# Patient Record
Sex: FEMALE | Race: WHITE | NOT HISPANIC OR LATINO
[De-identification: names, ages, dates, MRNs, and addresses within clinical notes are randomized per-mention and may not be internally consistent; named-entity substitution may affect disease eponyms.]

---

## 2017-02-06 ENCOUNTER — TRANSCRIPTION ENCOUNTER (OUTPATIENT)
Age: 32
End: 2017-02-06

## 2017-02-08 ENCOUNTER — TRANSCRIPTION ENCOUNTER (OUTPATIENT)
Age: 32
End: 2017-02-08

## 2017-03-29 ENCOUNTER — TRANSCRIPTION ENCOUNTER (OUTPATIENT)
Age: 32
End: 2017-03-29

## 2017-09-12 ENCOUNTER — TRANSCRIPTION ENCOUNTER (OUTPATIENT)
Age: 32
End: 2017-09-12

## 2018-01-17 ENCOUNTER — TRANSCRIPTION ENCOUNTER (OUTPATIENT)
Age: 33
End: 2018-01-17

## 2018-01-26 ENCOUNTER — TRANSCRIPTION ENCOUNTER (OUTPATIENT)
Age: 33
End: 2018-01-26

## 2018-05-04 ENCOUNTER — TRANSCRIPTION ENCOUNTER (OUTPATIENT)
Age: 33
End: 2018-05-04

## 2018-07-13 ENCOUNTER — TRANSCRIPTION ENCOUNTER (OUTPATIENT)
Age: 33
End: 2018-07-13

## 2018-08-31 ENCOUNTER — TRANSCRIPTION ENCOUNTER (OUTPATIENT)
Age: 33
End: 2018-08-31

## 2019-09-25 ENCOUNTER — TRANSCRIPTION ENCOUNTER (OUTPATIENT)
Age: 34
End: 2019-09-25

## 2021-02-25 ENCOUNTER — TRANSCRIPTION ENCOUNTER (OUTPATIENT)
Age: 36
End: 2021-02-25

## 2021-03-05 ENCOUNTER — TRANSCRIPTION ENCOUNTER (OUTPATIENT)
Age: 36
End: 2021-03-05

## 2021-03-11 ENCOUNTER — TRANSCRIPTION ENCOUNTER (OUTPATIENT)
Age: 36
End: 2021-03-11

## 2021-07-16 ENCOUNTER — TRANSCRIPTION ENCOUNTER (OUTPATIENT)
Age: 36
End: 2021-07-16

## 2021-07-17 PROBLEM — Z00.00 ENCOUNTER FOR PREVENTIVE HEALTH EXAMINATION: Status: ACTIVE | Noted: 2021-07-17

## 2021-07-20 ENCOUNTER — NON-APPOINTMENT (OUTPATIENT)
Age: 36
End: 2021-07-20

## 2021-07-20 ENCOUNTER — APPOINTMENT (OUTPATIENT)
Dept: ORTHOPEDIC SURGERY | Facility: CLINIC | Age: 36
End: 2021-07-20
Payer: MEDICAID

## 2021-07-20 VITALS
BODY MASS INDEX: 29.03 KG/M2 | WEIGHT: 196 LBS | HEIGHT: 69 IN | DIASTOLIC BLOOD PRESSURE: 91 MMHG | HEART RATE: 88 BPM | SYSTOLIC BLOOD PRESSURE: 142 MMHG

## 2021-07-20 DIAGNOSIS — Z78.9 OTHER SPECIFIED HEALTH STATUS: ICD-10-CM

## 2021-07-20 DIAGNOSIS — M25.572 PAIN IN LEFT ANKLE AND JOINTS OF LEFT FOOT: ICD-10-CM

## 2021-07-20 PROCEDURE — 99203 OFFICE O/P NEW LOW 30 MIN: CPT

## 2021-07-20 PROCEDURE — 73600 X-RAY EXAM OF ANKLE: CPT | Mod: LT

## 2021-07-20 PROCEDURE — 99072 ADDL SUPL MATRL&STAF TM PHE: CPT

## 2021-07-20 PROCEDURE — 73620 X-RAY EXAM OF FOOT: CPT | Mod: LT

## 2021-07-20 RX ORDER — TRAMADOL HYDROCHLORIDE AND ACETAMINOPHEN 37.5; 325 MG/1; MG/1
37.5-325 TABLET, FILM COATED ORAL
Qty: 40 | Refills: 0 | Status: ACTIVE | COMMUNITY
Start: 2021-07-20 | End: 1900-01-01

## 2021-07-21 VITALS — BODY MASS INDEX: 29.03 KG/M2 | WEIGHT: 196 LBS | HEIGHT: 69 IN

## 2021-07-21 NOTE — HISTORY OF PRESENT ILLNESS
[8] : a current pain level of 8/10 [de-identified] : The patient comes in today for her left ankle status post a fall in [place] about a week ago.  She states she came here because of significant pain to the ankle.  She states she went to [facility] and got an ankle x-ray and they stated she does not have a fracture.  She comes in today stating that she cannot even bear weight to the extremity.  She has swelling and ecchymosis.  The patient states the onset/injury occurred on 07/02/2021.  This injury is not work related or due to an automobile accident.  The patient states the pain is achy, throbbing and cramping. The patient describes the pain as constant. [de-identified] : Walking [de-identified] : Rest and ice

## 2021-07-21 NOTE — PHYSICAL EXAM
[Crutches] : ambulates with crutches [de-identified] : Left Ankle:  Range of motion not assessed secondary to significant pain.  She is significantly tender over the lateral malleolus.  She has tenderness over the fourth and fifth metatarsal area, as well.  No calf tenderness.  Good distal pulses.  She is unable to bear weight to the extremity.  She is on crutches.  \par \par Right Ankle:    Range of Motion in Degrees:\par 	                                Claimant:	                Normal:	\par Dorsiflexion (Active)	40-degrees	40-degrees	\par Dorsiflexion (Passive)	40-degrees	40-degrees	\par Plantar (Active)	                40-degrees	40-degrees	\par Plantar (Passive)	                40-degrees	40-degrees	\par Inversion (Active)	                30-degrees	30-degrees	\par Inversion (Passive)	                30-degrees	30-degrees	\par Eversion  (Active)	                20-degrees	20-degrees	\par Eversion (Passive) 	                20-degrees	20-degrees	\par \par No weakness in dorsiflexion, plantar flexion, inversion or eversion.  Normal sensation.  No tenderness over the medial or lateral ligaments.  No tenderness over the DLES.  No evidence of instability.  Negative anterior drawer sign.  No medial or lateral bony tenderness.  No proximal fibular tenderness.  No anterior, posterior, medial or lateral tendon tenderness.  No intra-articular swelling.  No extra-articular swelling, edema or tenderness.  No tenderness over the plantar aspect of the os calcis.  2+ DP and PT pulses. Skin is intact.  No rashes, scars or lesions.  \par   [de-identified] : Appearance:  Well developed, well-nourished female in no acute distress.\par   [de-identified] : Review of radiographs from another facility reveal no acute fractures or dislocations of the left ankle.  Significant soft tissue swelling.\par \par Radiographs, two views of the left ankle, reveal no fracture.\par \par Radiographs, two views of the left foot, looks like there might be a foot fracture, but there is significant soft tissue swelling near the fourth and fifth metatarsal.\par

## 2021-07-21 NOTE — DISCUSSION/SUMMARY
[de-identified] : At this time, due to left foot fracture and left ankle pain, we are going to get an MRI of the left ankle since there is a significant amount of pain and swelling to rule out a fracture status post a significant fall in [place] one week ago.  I recommended elevation, ice, keep the CAM boot on and nonweightbearing.  We put an order in for a CAM boot because she just ordered a short one on [employer] and we need a long one.  She will do a [employer] in 2 weeks.\par

## 2021-07-21 NOTE — ADDENDUM
[FreeTextEntry1] : This note was written by Kenn Tay on 07/21/2021, acting as a scribe for CHRISTIANE LEWIS, NOLAN/L, PA

## 2021-08-03 ENCOUNTER — APPOINTMENT (OUTPATIENT)
Dept: ORTHOPEDIC SURGERY | Facility: CLINIC | Age: 36
End: 2021-08-03
Payer: MEDICAID

## 2021-08-03 PROCEDURE — 99441: CPT

## 2021-08-17 ENCOUNTER — APPOINTMENT (OUTPATIENT)
Dept: ORTHOPEDIC SURGERY | Facility: CLINIC | Age: 36
End: 2021-08-17
Payer: MEDICAID

## 2021-08-17 VITALS
DIASTOLIC BLOOD PRESSURE: 84 MMHG | SYSTOLIC BLOOD PRESSURE: 118 MMHG | HEART RATE: 91 BPM | HEIGHT: 69 IN | WEIGHT: 196 LBS | BODY MASS INDEX: 29.03 KG/M2

## 2021-08-17 PROCEDURE — 99213 OFFICE O/P EST LOW 20 MIN: CPT

## 2021-08-18 NOTE — ADDENDUM
[FreeTextEntry1] : This note was written by Portia Aponte on 08/18/2021 acting as scribe for Letty Ribera, OTR/L, PA

## 2021-08-18 NOTE — PHYSICAL EXAM
[de-identified] : Left Ankle:\par The significant swelling has gone down.  She has no calf tenderness.  She has good distal pulses.  She is extremely tight and stiff.  Range of motion is not assessed secondary to fracture.  \par \par  [de-identified] : Ambulating with a CAM boot and crutches.  Station:  Normal.  [de-identified] : Appearance:  Well-developed, well-nourished female in no acute distress.\par

## 2021-08-18 NOTE — DISCUSSION/SUMMARY
[de-identified] : At this time, due to sprain/partial tear of the anterior talofibular ligament and stress fracture to the talus, the patient will return to the office in two weeks.  She is advised she can be weightbearing as tolerated. We will probably wean her into a low-profile ankle brace and start her on some physical therapy in two weeks.

## 2021-08-18 NOTE — HISTORY OF PRESENT ILLNESS
[de-identified] : The patient comes in today for a partial tearing of the anterior talofibular ligament and a stress fracture to the talus.  She is in a CAM boot and uses crutches.  The patient is almost afraid to bear weight to the extremity.  She is doing it with two crutches, but is advised she could do it with one or do weightbearing as tolerated.

## 2021-08-31 ENCOUNTER — APPOINTMENT (OUTPATIENT)
Dept: ORTHOPEDIC SURGERY | Facility: CLINIC | Age: 36
End: 2021-08-31
Payer: MEDICAID

## 2021-08-31 VITALS
DIASTOLIC BLOOD PRESSURE: 88 MMHG | WEIGHT: 196 LBS | HEART RATE: 94 BPM | BODY MASS INDEX: 29.03 KG/M2 | HEIGHT: 69 IN | SYSTOLIC BLOOD PRESSURE: 143 MMHG

## 2021-08-31 PROCEDURE — 99212 OFFICE O/P EST SF 10 MIN: CPT

## 2021-09-01 NOTE — ADDENDUM
[FreeTextEntry1] : This note was written by Kenn Tay on 09/01/2021, acting as a scribe for CHRISTIANE LEWIS, NOLAN/L, PA

## 2021-09-01 NOTE — DISCUSSION/SUMMARY
[de-identified] : At this time, due to left ankle contusion of talus bone, tear of the anterior talofibular ligament, and hematoma, I recommended to start aggressive physical therapy, low-profile ankle brace to be weaned into, and to return to the office in 3 weeks.  She will be out of work.\par

## 2021-09-01 NOTE — HISTORY OF PRESENT ILLNESS
[de-identified] : The patient comes in today for her left ankle.  The patient has a tear of the anterior talofibular ligament, contusion of bone of talus, and hematoma.  The patient is still in the boot and she has been walking with her CAM boot and the use of crutches.  She cannot actually bear weight without the use of the CAM boot on.

## 2021-09-01 NOTE — PHYSICAL EXAM
[de-identified] : Left Ankle:  Decreased range of motion with significant stiffness and some atrophy in the whole lower extremity.  Neurovascular and neurologic within normal limits.  \par \par \par \par 	                               \par \par \par   2+ DP and PT pulses. Skin is intact.  No rashes, scars or lesions.  \par   [de-identified] : Ambulating with a CAM boot and the use of crutches. [de-identified] : Appearance:  Well developed, well-nourished female in no acute distress.\par

## 2021-09-21 ENCOUNTER — APPOINTMENT (OUTPATIENT)
Dept: ORTHOPEDIC SURGERY | Facility: CLINIC | Age: 36
End: 2021-09-21
Payer: MEDICAID

## 2021-09-21 VITALS
HEIGHT: 69 IN | BODY MASS INDEX: 29.03 KG/M2 | HEART RATE: 114 BPM | SYSTOLIC BLOOD PRESSURE: 119 MMHG | WEIGHT: 196 LBS | DIASTOLIC BLOOD PRESSURE: 87 MMHG

## 2021-09-21 DIAGNOSIS — T14.8XXA OTHER INJURY OF UNSPECIFIED BODY REGION, INITIAL ENCOUNTER: ICD-10-CM

## 2021-09-21 PROCEDURE — 99213 OFFICE O/P EST LOW 20 MIN: CPT

## 2021-09-23 NOTE — ADDENDUM
[FreeTextEntry1] : This note was written by Flory Bermeo on 09/23/2021 acting as scribe for Letty Ribera, NOLAN/L, PA

## 2021-09-23 NOTE — HISTORY OF PRESENT ILLNESS
[de-identified] : The patient comes in today for her left ankle status post a hematoma, contusion of the bone and sprain of the anterior talofibular ligament.  She is doing physical therapy.  She is doing much better.  She is off the crutches.  She is out of the boot.  She is in a low profile ankle brace.

## 2021-09-23 NOTE — PHYSICAL EXAM
[de-identified] : Left Ankle: \par She still has significant weakness and tightness in her tendons but she has improved significantly.  Range of motion in dorsiflexion and plantar flexion is definitely weak and decreased secondary to pain.  \par  [de-identified] : She walks with an antalgic gait pattern still.   [de-identified] : General Appearance:  Well-developed, well-nourished female in no acute distress.

## 2021-09-23 NOTE — DISCUSSION/SUMMARY
[de-identified] : The patient is going to continue in aggressive physical therapy.  She will return back to the office in a few weeks.

## 2021-10-19 ENCOUNTER — APPOINTMENT (OUTPATIENT)
Dept: ORTHOPEDIC SURGERY | Facility: CLINIC | Age: 36
End: 2021-10-19

## 2021-10-21 ENCOUNTER — APPOINTMENT (OUTPATIENT)
Dept: ORTHOPEDIC SURGERY | Facility: CLINIC | Age: 36
End: 2021-10-21
Payer: MEDICAID

## 2021-10-21 VITALS
DIASTOLIC BLOOD PRESSURE: 83 MMHG | SYSTOLIC BLOOD PRESSURE: 125 MMHG | HEART RATE: 101 BPM | BODY MASS INDEX: 29.03 KG/M2 | WEIGHT: 196 LBS | HEIGHT: 69 IN

## 2021-10-21 PROCEDURE — 99212 OFFICE O/P EST SF 10 MIN: CPT

## 2021-10-26 NOTE — PHYSICAL EXAM
[de-identified] : Left Ankle: \par Range of Motion in Degrees:\par 	                                Claimant:	Normal:	\par Dorsiflexion (Active)	40-degrees	40-degrees	\par Dorsiflexion (Passive)	40-degrees	40-degrees	\par Plantar (Active)       	40-degrees	40-degrees	\par Plantar (Passive)     	40-degrees	40-degrees	\par Inversion (Active)    	30-degrees	30-degrees	\par Inversion (Passive)  	30-degrees	30-degrees	\par Eversion  (Active)   	20-degrees	20-degrees	\par Eversion (Passive)   	20-degrees	20-degrees	\par \par No weakness in dorsiflexion, plantar flexion, inversion or eversion.  Normal sensation.  Positive diffuse swelling laterally.  Tender over the lateral ligaments.  No tenderness over the medial  ligaments.  No tenderness over the DLES.  No evidence of instability.  Negative anterior drawer sign.  No medial bony tenderness.  No proximal fibular tenderness.  No anterior, posterior, medial or lateral tendon tenderness.  No intra-articular swelling.  No extra-articular swelling, edema or tenderness.  No tenderness over the plantar aspect of the os calcis.  2+ DP and PT pulses. Skin is intact.  No rashes, scars or lesions.  \par   [de-identified] : Ambulating with a slightly antalgic to antalgic gait.  Station:  Normal.  [de-identified] : Appearance:  Well-developed, well-nourished female in no acute distress.\par

## 2021-10-26 NOTE — CONSULT LETTER
[Dear  ___] : Dear  [unfilled], [FreeTextEntry1] : \par I am referring Letty Hanna to you for further evaluation.\par \par Thank you very much for seeing this patient.  My most recent evaluation\par follows.\par \par If you have any questions, please do not hesitate to contact me.\par \par Sincerely,\par \par \par Tim Cardona III, MD\par [facility]/sg

## 2021-10-26 NOTE — DISCUSSION/SUMMARY
[de-identified] : At this time, due to lateral ankle sprain of the left ankle, I recommend that she be evaluated by Dr. Benito because of the chronicity of the problem.

## 2021-10-26 NOTE — ADDENDUM
[FreeTextEntry1] : This note was written by Portia Aponte on 10/26/2021 acting as a scribe for FRANKY JURADO III, MD

## 2021-11-01 ENCOUNTER — APPOINTMENT (OUTPATIENT)
Dept: ORTHOPEDIC SURGERY | Facility: CLINIC | Age: 36
End: 2021-11-01
Payer: MEDICAID

## 2021-11-01 DIAGNOSIS — T14.8XXA OTHER INJURY OF UNSPECIFIED BODY REGION, INITIAL ENCOUNTER: ICD-10-CM

## 2021-11-01 PROCEDURE — 99214 OFFICE O/P EST MOD 30 MIN: CPT

## 2021-11-01 RX ORDER — METHYLPREDNISOLONE 4 MG/1
4 TABLET ORAL
Qty: 1 | Refills: 0 | Status: ACTIVE | COMMUNITY
Start: 2021-11-01 | End: 1900-01-01

## 2021-11-01 NOTE — ADDENDUM
[FreeTextEntry1] : I, Lalita Claire, acted solely as a scribe for Dr. Skyler Benito on this date 11/01/2021.\par \par All medical record entries made by the Scribe were at my, Dr. Skyler Benito, direction and personally dictated by me on 11/01/2021 . I have reviewed the chart and agree that the record accurately reflects my personal performance of the history, physical exam, assessment and plan. I have also personally directed, reviewed, and agreed with the chart.	\par

## 2021-11-01 NOTE — DISCUSSION/SUMMARY
[de-identified] : Today I had a lengthy discussion with the patient regarding their left ankle pain. I have addressed all the patient's concerns surrounding the pathology of their condition. MRI  results were reviewed with the patient today in the clinic. At this time, I am recommending that the patient undergo conservative management for treatment. I advised that the patient utilize a methylprednisolone steroid taper pack. The prescription was provided in the office today. I recommend that the patient utilize ice, NSAIDs, and heat PRN. They can also elevate their left ankle above the level of the heart. The patient understood and verbally agreed to the treatment plan. All of their questions were answered and they were satisfied with the visit. The patient should call the office if they have any questions or experience worsening symptoms. I would like to see the patient back in the office in 6 weeks to reassess their condition. 				\par \par Patient is 100% temporarily disabled and will be out of work until further notice.\par

## 2021-11-01 NOTE — PHYSICAL EXAM
[de-identified] : General: Alert and oriented x3. In no acute distress. Pleasant in nature with a normal affect. No apparent respiratory distress.\par \par Left Ankle Exam\par Skin: Clean, dry, intact\par Inspection: No obvious malalignment, no swelling, no effusion; no lymphadenopathy\par Pulses: 2+ DP/PT pulses\par ROM:10 degrees of dorsiflexion, 40 degrees of plantarflexion, 10 degrees of subtalar motion\par Tenderness: + Anterior tibiotalar joint pain. No tenderness over the lateral malleolus, no CFL/ATFL/PTFL pain. No medial malleolus pain, no deltoid ligament pain. No proximal fibular pain. No heel pain.\par Stability: Negative anterior/posterior drawer.\par Strength: 5/5 TA/GS/EHL\par Neuro: In tact to light touch throughout\par Additional tests: Negative Mortons test, Negative syndesmosis squeeze test. [de-identified] : Office Location: [address] [place] [address], [facility]\par Office Phone: [phone]\par Office Fax: [phone]\par PATIENT NAME: Letty Hanna\par PATIENT PHONE NUMBER:\par PATIENT ID: [id number]\par : 1985\par DATE OF EXAM: 2021\par R. Phys. Name: Letty Ribera\par R. Phys. Address: [address] [place] [address], [address] [place] [address], [place] [address]\par R. Phys. Phone: [phone]\par MRI-3T LEFT ANKLE NON CONTRAST\par \par History: Left ankle pain and swelling two weeks after injury.\par \par M25.572\par R22.42\par \par Comparison Studies: None\par \par Technique: The left ankle, hindfoot and proximal midfoot were imaged in a 3.0\par [employer] ultra high field magnetic resonance imaging unit. Sagittal proton density\par and STIR images; coronal proton density images; and axial proton density and\par fat-saturated proton density images were obtained.\par \par Findings:\par \par There is severe dorsolateral subcutaneous edema. Axial images 10-15 of series 4\par suggest an associated small hematoma in the deep subcutaneous fat at the\par anterolateral aspect of the ankle and midfoot.\par \par Physiologic fluid is seen within the included articulations. No bursal effusion\par is visualized.\par \par There is mild plantar fasciitis.\par \par The structures of the sinus tarsi exhibit normal morphology and signal, and the\par sinus tarsi fat is well-preserved.\par \par The visualized neurovascular structures are unremarkable.\par \par The Achilles' tendon, medial flexor tendons, peroneus tendons and extensor\par tendons are unremarkable.\par \par There is a partial tear of the anterior talofibular ligament, axial images 12-16\par of series 4.\par \par Marrow edema is visualized in the medial portion of the body and neck of the\par talus, axial images 14-16 of series 4 and sagittal images 13-17 of series 6.\par This could represent bone contusion or stress reaction. No associated cortical\par fracture or trabecular fracture is visualized.\par \par There is no evidence of fracture, osteonecrosis or osseous neoplasm.\par \par No significant arthritic changes are seen within the included articulations.\par \par The visualized muscle bellies exhibit normal size, shape and signal, without\par evidence of edema or atrophy.\par \par No soft tissue mass is visualized.\par \par No soft tissue cyst or extrasynovial fluid collection is visualized.\par \par IMPRESSION:\par \par Dorsolateral subcutaneous edema, probably with an associated small hematoma in\par the deep dorsolateral subcutaneous fat.\par \par Partial tear of the anterior talofibular ligament.\par \par Bone contusion in the medial talus.\par \par T79.2XXA\par S93.492A\par S90.02XA\par \par Signed by: Brendan Tee MD\par Signed Date: 2021 3:27 PM EDT\par \par \par \par SIGNED BY: Brendan Tee M.D., Ext. [phone] 03:27 PM\par

## 2021-11-01 NOTE — HISTORY OF PRESENT ILLNESS
[FreeTextEntry1] : 11/01/2021: CHRISTIANE ALVAREZ is a 36 year old female presenting for an initial evaluation of her left ankle. She injured the ankle in July 2021 due to falling while in [place]. Pain 4/10, with an intermittent timing. She is concerned with stiffness to the ankle. She has seen Dr. Cardona for this issue, who diagnosed the patient with a sprain of the anterior talofibular ligament of left ankle and referred her to this clinic. The patient presents wearing sneakers without any assistance devices. CHRISTIANE denies any numbness or tingling sensations. No other complaints.

## 2021-12-27 ENCOUNTER — APPOINTMENT (OUTPATIENT)
Dept: ORTHOPEDIC SURGERY | Facility: CLINIC | Age: 36
End: 2021-12-27
Payer: MEDICAID

## 2021-12-27 DIAGNOSIS — S93.492A SPRAIN OF OTHER LIGAMENT OF LEFT ANKLE, INITIAL ENCOUNTER: ICD-10-CM

## 2021-12-27 DIAGNOSIS — S92.902A UNSPECIFIED FRACTURE OF LEFT FOOT, INITIAL ENCOUNTER FOR CLOSED FRACTURE: ICD-10-CM

## 2021-12-27 DIAGNOSIS — M25.572 PAIN IN LEFT ANKLE AND JOINTS OF LEFT FOOT: ICD-10-CM

## 2021-12-27 PROCEDURE — 99213 OFFICE O/P EST LOW 20 MIN: CPT

## 2021-12-27 RX ORDER — LIDOCAINE HYDROCHLORIDE 20 MG/ML
2 SOLUTION ORAL; TOPICAL
Qty: 300 | Refills: 0 | Status: ACTIVE | COMMUNITY
Start: 2021-07-03

## 2021-12-27 RX ORDER — AMOXICILLIN AND CLAVULANATE POTASSIUM 500; 125 MG/1; MG/1
500-125 TABLET, FILM COATED ORAL
Qty: 14 | Refills: 0 | Status: ACTIVE | COMMUNITY
Start: 2021-07-03

## 2021-12-27 RX ORDER — PHENTERMINE HYDROCHLORIDE 37.5 MG/1
37.5 TABLET ORAL
Qty: 30 | Refills: 0 | Status: ACTIVE | COMMUNITY
Start: 2021-12-16

## 2021-12-27 RX ORDER — FLUCONAZOLE 150 MG/1
150 TABLET ORAL
Qty: 8 | Refills: 0 | Status: ACTIVE | COMMUNITY
Start: 2021-12-02

## 2021-12-27 RX ORDER — METRONIDAZOLE 500 MG/1
500 TABLET ORAL
Qty: 14 | Refills: 0 | Status: ACTIVE | COMMUNITY
Start: 2021-10-11

## 2021-12-27 RX ORDER — DOXYCYCLINE HYCLATE 100 MG/1
100 CAPSULE ORAL
Qty: 14 | Refills: 0 | Status: ACTIVE | COMMUNITY
Start: 2021-12-02

## 2021-12-27 RX ORDER — CLINDAMYCIN PHOSPHATE 20 MG/G
2 CREAM VAGINAL
Qty: 40 | Refills: 0 | Status: ACTIVE | COMMUNITY
Start: 2021-07-03

## 2021-12-27 RX ORDER — HYDROCHLOROTHIAZIDE 12.5 MG/1
12.5 TABLET ORAL
Qty: 90 | Refills: 0 | Status: ACTIVE | COMMUNITY
Start: 2021-11-11

## 2021-12-27 NOTE — PHYSICAL EXAM
[de-identified] : General: Alert and oriented x3. In no acute distress. Pleasant in nature with a normal affect. No apparent respiratory distress.\par \par Left Ankle Exam\par Skin: Clean, dry, intact\par Inspection: No obvious malalignment, no swelling, no effusion; no lymphadenopathy\par Pulses: 2+ DP/PT pulses\par ROM:10 degrees of dorsiflexion, 40 degrees of plantarflexion, 10 degrees of subtalar motion\par Tenderness: + Anterior tibiotalar joint pain. No tenderness over the lateral malleolus, + lateral ligaments tenderness. No medial malleolus pain, no deltoid ligament pain. No proximal fibular pain. No heel pain.\par Stability: Negative anterior/posterior drawer.\par Strength: 5/5 TA/GS/EHL\par Neuro: In tact to light touch throughout\par Additional tests: Negative Mortons test, Negative syndesmosis squeeze test. [de-identified] : No new imaging was obtained.

## 2021-12-27 NOTE — DISCUSSION/SUMMARY
[de-identified] : Today I had a lengthy discussion with the patient regarding their left ankle sprain. I have addressed all the patient's concerns surrounding the pathology of their condition. We discussed non-operative treatment options in great detail with respect to oral medications versus possible cortisone injections to the ankle. I recommend the patient continue to undergo a course of physical therapy for the left ankle  2-3 times a week for a total of 6-8 weeks. The patient requested an additional handicap permit to utilize for the next 3 months. Updated forms were filled in the office today. The patient understood and verbally agreed to the treatment plan. All of their questions were answered and they were satisfied with the visit. The patient should call the office if they have any questions or experience worsening symptoms. I would like to see the patient back in the office in PRN to reassess their condition. 	\par \par The patient is cleared to return to work, full time starting on 1/10/2022, with restrictions including elevator utilization.

## 2021-12-27 NOTE — HISTORY OF PRESENT ILLNESS
[FreeTextEntry1] : 12/27/2021: The patient is a 36 year old female presenting for a follow-up evaluation of left ankle sprain. She continues with physical therapy for this issue with significant improvement. Pain is stated to be improved, with a stiff character. She is concerned with ROM to the ankle. The patient reports that she recently utilized an oral methylprednisolone steroid taper pack as prescribed by the clinic with significant improvement. She is concerned with a possible cortisone injection to the ankle at this time. No other complaints. 	\par \par 11/01/2021: CHRISTIANE ALVAREZ is a 36 year old female presenting for an initial evaluation of her left ankle. She injured the ankle in July 2021 due to falling while in [place]. Pain 4/10, with an intermittent timing. She is concerned with stiffness to the ankle. She has seen Dr. Cardona for this issue, who diagnosed the patient with a sprain of the anterior talofibular ligament of left ankle and referred her to this clinic. The patient presents wearing sneakers without any assistance devices. CHRISTIANE denies any numbness or tingling sensations. No other complaints.

## 2021-12-27 NOTE — ADDENDUM
[FreeTextEntry1] : I, Lalita Claire, acted solely as a scribe for Dr. Skyler Benito on this date 12/27/2021.\par \par All medical record entries made by the Scribe were at my, Dr. Skyler Benito, direction and personally dictated by me on 12/27/2021 . I have reviewed the chart and agree that the record accurately reflects my personal performance of the history, physical exam, assessment and plan. I have also personally directed, reviewed, and agreed with the chart.	\par

## 2022-03-20 ENCOUNTER — TRANSCRIPTION ENCOUNTER (OUTPATIENT)
Age: 37
End: 2022-03-20

## 2022-05-27 ENCOUNTER — NON-APPOINTMENT (OUTPATIENT)
Age: 37
End: 2022-05-27

## 2022-05-30 ENCOUNTER — NON-APPOINTMENT (OUTPATIENT)
Age: 37
End: 2022-05-30

## 2023-01-10 ENCOUNTER — NON-APPOINTMENT (OUTPATIENT)
Age: 38
End: 2023-01-10

## 2023-02-14 ENCOUNTER — NON-APPOINTMENT (OUTPATIENT)
Age: 38
End: 2023-02-14

## 2023-09-24 ENCOUNTER — NON-APPOINTMENT (OUTPATIENT)
Age: 38
End: 2023-09-24

## 2024-05-13 ENCOUNTER — NON-APPOINTMENT (OUTPATIENT)
Age: 39
End: 2024-05-13

## 2024-07-21 ENCOUNTER — NON-APPOINTMENT (OUTPATIENT)
Age: 39
End: 2024-07-21

## 2025-04-03 ENCOUNTER — NON-APPOINTMENT (OUTPATIENT)
Age: 40
End: 2025-04-03

## 2025-05-30 ENCOUNTER — NON-APPOINTMENT (OUTPATIENT)
Age: 40
End: 2025-05-30

## 2025-08-09 ENCOUNTER — NON-APPOINTMENT (OUTPATIENT)
Age: 40
End: 2025-08-09

## 2025-09-18 ENCOUNTER — NON-APPOINTMENT (OUTPATIENT)
Age: 40
End: 2025-09-18